# Patient Record
Sex: MALE | URBAN - METROPOLITAN AREA
[De-identification: names, ages, dates, MRNs, and addresses within clinical notes are randomized per-mention and may not be internally consistent; named-entity substitution may affect disease eponyms.]

---

## 2021-01-28 ENCOUNTER — APPOINTMENT (OUTPATIENT)
Dept: URBAN - METROPOLITAN AREA CLINIC 186 | Age: 31
Setting detail: DERMATOLOGY
End: 2021-01-28

## 2021-01-28 DIAGNOSIS — L66.2 FOLLICULITIS DECALVANS: ICD-10-CM

## 2021-01-28 PROCEDURE — OTHER PRESCRIPTION: OTHER

## 2021-01-28 PROCEDURE — OTHER DIAGNOSIS COMMENT: OTHER

## 2021-01-28 PROCEDURE — OTHER TREATMENT REGIMEN: OTHER

## 2021-01-28 PROCEDURE — 99203 OFFICE O/P NEW LOW 30 MIN: CPT

## 2021-01-28 PROCEDURE — OTHER MIPS QUALITY: OTHER

## 2021-01-28 PROCEDURE — OTHER COUNSELING: OTHER

## 2021-01-28 PROCEDURE — OTHER ADDITIONAL NOTES: OTHER

## 2021-01-28 RX ORDER — HALOBETASOL PROPIONATE 0.5 MG/G
AEROSOL, FOAM TOPICAL
Qty: 1 | Refills: 3 | Status: ERX | COMMUNITY
Start: 2021-01-28

## 2021-01-28 RX ORDER — DOXYCYCLINE HYCLATE 200 MG/1
TABLET, DELAYED RELEASE ORAL
Qty: 30 | Refills: 3 | Status: ERX | COMMUNITY
Start: 2021-01-28

## 2021-01-28 ASSESSMENT — LOCATION SIMPLE DESCRIPTION DERM: LOCATION SIMPLE: SCALP

## 2021-01-28 ASSESSMENT — LOCATION ZONE DERM: LOCATION ZONE: SCALP

## 2021-01-28 ASSESSMENT — LOCATION DETAILED DESCRIPTION DERM: LOCATION DETAILED: LEFT SUPERIOR PARIETAL SCALP

## 2021-01-28 NOTE — PROCEDURE: DIAGNOSIS COMMENT
Render Risk Assessment In Note?: no
Comment: Discussed clinically favor folliculitis raul. Discussed condition at length including long term expectations and treatment options. Discussed potential risks and side effects.  Will start doryx 200 and lexette.
Detail Level: Zone

## 2021-01-28 NOTE — PROCEDURE: TREATMENT REGIMEN
Samples Given: Lexette
Detail Level: Zone
Initiate Treatment: Doryx 200 mg daily\\nLexette foam twice daily M-F taking weekends off

## 2021-01-28 NOTE — PROCEDURE: ADDITIONAL NOTES
Additional Notes: Physician correspondence enclosed to Dr. West
Render Risk Assessment In Note?: no
Detail Level: Simple

## 2021-04-28 ENCOUNTER — APPOINTMENT (OUTPATIENT)
Dept: URBAN - METROPOLITAN AREA CLINIC 186 | Age: 31
Setting detail: DERMATOLOGY
End: 2021-04-28

## 2021-04-28 VITALS — TEMPERATURE: 97.7 F

## 2021-04-28 VITALS — HEIGHT: 60 IN

## 2021-04-28 DIAGNOSIS — L66.2 FOLLICULITIS DECALVANS: ICD-10-CM

## 2021-04-28 PROCEDURE — OTHER ADDITIONAL NOTES: OTHER

## 2021-04-28 PROCEDURE — OTHER COUNSELING: OTHER

## 2021-04-28 PROCEDURE — 99213 OFFICE O/P EST LOW 20 MIN: CPT

## 2021-04-28 PROCEDURE — OTHER PRESCRIPTION: OTHER

## 2021-04-28 PROCEDURE — OTHER MIPS QUALITY: OTHER

## 2021-04-28 PROCEDURE — OTHER DIAGNOSIS COMMENT: OTHER

## 2021-04-28 PROCEDURE — OTHER TREATMENT REGIMEN: OTHER

## 2021-04-28 PROCEDURE — OTHER PRESCRIPTION MEDICATION MANAGEMENT: OTHER

## 2021-04-28 RX ORDER — DOXYCYCLINE 40 MG/1
CAPSULE ORAL
Qty: 30 | Refills: 6 | Status: ERX | COMMUNITY
Start: 2021-04-28

## 2021-04-28 ASSESSMENT — LOCATION SIMPLE DESCRIPTION DERM: LOCATION SIMPLE: SCALP

## 2021-04-28 ASSESSMENT — LOCATION DETAILED DESCRIPTION DERM: LOCATION DETAILED: LEFT SUPERIOR PARIETAL SCALP

## 2021-04-28 ASSESSMENT — LOCATION ZONE DERM: LOCATION ZONE: SCALP

## 2021-04-28 NOTE — PROCEDURE: TREATMENT REGIMEN
Detail Level: Zone
Continue Regimen: Lexette foam twice daily M-F taking weekends off as needed for flares
Discontinue Regimen: Doryx
Initiate Treatment: Oracea 40mg by mouth when Doryx is completed

## 2021-04-28 NOTE — PROCEDURE: ADDITIONAL NOTES
Render Risk Assessment In Note?: no
Detail Level: Simple
Additional Notes: Discussed Doryx and lexette should not be used long-term and are more of a rescue regimen for flares.  Recommended tapering Doryx to every other day and Oracea 40 mg by mouth daily for future treatment

## 2021-04-28 NOTE — PROCEDURE: PRESCRIPTION MEDICATION MANAGEMENT
Render In Strict Bullet Format?: No
Continue Regimen: Lexette foam Apply to affected areas twice daily Monday-Friday, taking weekends off
Detail Level: Zone

## 2021-08-12 ENCOUNTER — APPOINTMENT (OUTPATIENT)
Dept: URBAN - METROPOLITAN AREA CLINIC 186 | Age: 31
Setting detail: DERMATOLOGY
End: 2021-08-12

## 2021-08-12 ENCOUNTER — RX ONLY (RX ONLY)
Age: 31
End: 2021-08-12

## 2021-08-12 DIAGNOSIS — L66.2 FOLLICULITIS DECALVANS: ICD-10-CM

## 2021-08-12 PROCEDURE — OTHER MIPS QUALITY: OTHER

## 2021-08-12 PROCEDURE — 99213 OFFICE O/P EST LOW 20 MIN: CPT

## 2021-08-12 PROCEDURE — OTHER COUNSELING: OTHER

## 2021-08-12 PROCEDURE — OTHER TREATMENT REGIMEN: OTHER

## 2021-08-12 PROCEDURE — OTHER ADDITIONAL NOTES: OTHER

## 2021-08-12 PROCEDURE — OTHER PRESCRIPTION MEDICATION MANAGEMENT: OTHER

## 2021-08-12 PROCEDURE — OTHER DIAGNOSIS COMMENT: OTHER

## 2021-08-12 RX ORDER — DOXYCYCLINE 40 MG/1
CAPSULE ORAL
Qty: 30 | Refills: 6 | Status: ERX | COMMUNITY
Start: 2021-08-12

## 2021-08-12 ASSESSMENT — LOCATION SIMPLE DESCRIPTION DERM: LOCATION SIMPLE: SCALP

## 2021-08-12 ASSESSMENT — LOCATION ZONE DERM: LOCATION ZONE: SCALP

## 2021-08-12 ASSESSMENT — LOCATION DETAILED DESCRIPTION DERM: LOCATION DETAILED: LEFT SUPERIOR PARIETAL SCALP

## 2021-08-12 NOTE — PROCEDURE: DIAGNOSIS COMMENT
Comment: Patient continues to do well. Has been out of meds for the last two weeks. Denies flare.  Discussed continuing current regimen versus stopping meds.  Discussed potentials risks including flare.  After discussion the patient prefers to continue oracea.
Render Risk Assessment In Note?: no
Detail Level: Zone

## 2021-08-12 NOTE — PROCEDURE: TREATMENT REGIMEN
Detail Level: Zone
Continue Regimen: Lexette foam twice daily M-F taking weekends off as needed for flares\\nRestart Oracea 40mg once daily

## 2021-08-12 NOTE — PROCEDURE: ADDITIONAL NOTES
Additional Notes: Recommended to restart oracea 40mg once daily in addition to lexette as needed for flares.  Discussed patient’s regimen is safe to use for long periods of time.  Recommended to follow up in 6 months
Detail Level: Simple
Render Risk Assessment In Note?: no

## 2021-08-12 NOTE — PROCEDURE: PRESCRIPTION MEDICATION MANAGEMENT
Continue Regimen: Lexette foam Apply to affected areas twice daily Monday-Friday, taking weekends off\\nRestart Oracea 40 mg by mouth daily
Detail Level: Zone
Render In Strict Bullet Format?: No

## 2022-04-08 ENCOUNTER — RX ONLY (RX ONLY)
Age: 32
End: 2022-04-08

## 2022-04-08 ENCOUNTER — APPOINTMENT (OUTPATIENT)
Dept: URBAN - METROPOLITAN AREA CLINIC 186 | Age: 32
Setting detail: DERMATOLOGY
End: 2022-04-08

## 2022-04-08 DIAGNOSIS — Z41.9 ENCOUNTER FOR PROCEDURE FOR PURPOSES OTHER THAN REMEDYING HEALTH STATE, UNSPECIFIED: ICD-10-CM

## 2022-04-08 DIAGNOSIS — L66.2 FOLLICULITIS DECALVANS: ICD-10-CM

## 2022-04-08 PROCEDURE — OTHER ADDITIONAL NOTES: OTHER

## 2022-04-08 PROCEDURE — OTHER DIAGNOSIS COMMENT: OTHER

## 2022-04-08 PROCEDURE — 99213 OFFICE O/P EST LOW 20 MIN: CPT

## 2022-04-08 PROCEDURE — OTHER COSMETIC CONSULTATION - RED SPOTS: OTHER

## 2022-04-08 PROCEDURE — OTHER COSMETIC CONSULTATION: RED SPOTS: OTHER

## 2022-04-08 PROCEDURE — OTHER COUNSELING: OTHER

## 2022-04-08 PROCEDURE — OTHER PRESCRIPTION MEDICATION MANAGEMENT: OTHER

## 2022-04-08 PROCEDURE — OTHER COSMETIC CONSULTATION: MICRONEEDLING: OTHER

## 2022-04-08 RX ORDER — DOXYCYCLINE 40 MG/1
CAPSULE ORAL
Qty: 30 | Refills: 6 | Status: ERX

## 2022-04-08 ASSESSMENT — LOCATION ZONE DERM: LOCATION ZONE: SCALP

## 2022-04-08 ASSESSMENT — LOCATION SIMPLE DESCRIPTION DERM
LOCATION SIMPLE: SCALP
LOCATION SIMPLE: POSTERIOR SCALP

## 2022-04-08 ASSESSMENT — LOCATION DETAILED DESCRIPTION DERM
LOCATION DETAILED: POSTERIOR MID-PARIETAL SCALP
LOCATION DETAILED: LEFT SUPERIOR PARIETAL SCALP

## 2022-04-08 NOTE — PROCEDURE: DIAGNOSIS COMMENT
Comment: Patient doing well. Discussed active disease minimal.  Minimal flares.  Denies gi distress with oracea.  Will continue.  Discussed hair care and shaving.  Discussed scarring at length and discussed cynergy, micro needling and infini.  Discussed all off label in this area and discussed potential risks and side effects as well as results.  Patient to think about options
Render Risk Assessment In Note?: no
Detail Level: Simple

## 2022-04-08 NOTE — PROCEDURE: ADDITIONAL NOTES
Render Risk Assessment In Note?: no
Detail Level: Simple
Additional Notes: Discussed the differences between folliculitis vs ingrown hairs.
Additional Notes: Infini:  Microneedling with fractionated radiofrequency  helps improve color, clarity, texture, and collagen stimulation.  This is typically  a series of treatments\\nThese are performed at monthly intervals for a set of three.  Pretreating the skin with a prescription Vitamin A ( tretinoin) will be recommended 2-4 weeks before the procedure. The patient is aware that practicing sun avoidance and daily application of sunscreens and sun block will improve results.  The patient was counseled that any development of cold sores could be detrimental after a resurfacing procedure  and agrees to call the office for immediate attention.

## 2022-04-08 NOTE — PROCEDURE: PRESCRIPTION MEDICATION MANAGEMENT
Render In Strict Bullet Format?: No
Detail Level: Zone
Plan: Patient states he hasn’t noticed any breakouts with current regimen. Patient has not been using the Lexette foam, stating he is stable with just the oracea 40mg.
Discontinue Regimen: Lexette foam
Continue Regimen: Oracea 40 mg by mouth daily.

## 2023-02-08 ENCOUNTER — RX ONLY (RX ONLY)
Age: 33
End: 2023-02-08

## 2023-02-08 RX ORDER — DOXYCYCLINE HYCLATE 50 MG/1
TABLET, FILM COATED ORAL
Qty: 30 | Refills: 2 | Status: ERX | COMMUNITY
Start: 2023-02-08

## 2024-01-29 ENCOUNTER — RX ONLY (RX ONLY)
Age: 34
End: 2024-01-29

## 2024-01-29 ENCOUNTER — APPOINTMENT (OUTPATIENT)
Dept: URBAN - METROPOLITAN AREA CLINIC 186 | Age: 34
Setting detail: DERMATOLOGY
End: 2024-01-29

## 2024-01-29 DIAGNOSIS — L66.2 FOLLICULITIS DECALVANS: ICD-10-CM

## 2024-01-29 PROCEDURE — 99213 OFFICE O/P EST LOW 20 MIN: CPT

## 2024-01-29 PROCEDURE — OTHER COUNSELING: OTHER

## 2024-01-29 PROCEDURE — OTHER PRESCRIPTION MEDICATION MANAGEMENT: OTHER

## 2024-01-29 PROCEDURE — OTHER DIAGNOSIS COMMENT: OTHER

## 2024-01-29 RX ORDER — DOXYCYCLINE 40 MG/1
CAPSULE ORAL
Qty: 30 | Refills: 11 | Status: ERX

## 2024-01-29 ASSESSMENT — LOCATION SIMPLE DESCRIPTION DERM: LOCATION SIMPLE: SCALP

## 2024-01-29 ASSESSMENT — LOCATION ZONE DERM: LOCATION ZONE: SCALP

## 2024-01-29 ASSESSMENT — LOCATION DETAILED DESCRIPTION DERM: LOCATION DETAILED: LEFT SUPERIOR PARIETAL SCALP

## 2024-01-29 NOTE — PROCEDURE: PRESCRIPTION MEDICATION MANAGEMENT
Render In Strict Bullet Format?: No
Detail Level: Zone
Initiate Treatment: Oracea 40 mg by mouth daily.

## 2024-01-29 NOTE — PROCEDURE: DIAGNOSIS COMMENT
Comment: Patient has been out of meds for a few months.  Has not had many flares.  Discussed predominately scars.  Discussed goals.  Patient denies gi distress. Patient prefers to restart oracea after discussing risks and side effects.
Render Risk Assessment In Note?: no
Detail Level: Simple

## 2024-04-10 ENCOUNTER — RX ONLY (RX ONLY)
Age: 34
End: 2024-04-10

## 2024-04-10 RX ORDER — DOXYCYCLINE HYCLATE 50 MG/1
TABLET, FILM COATED ORAL
Qty: 30 | Refills: 2 | Status: ERX | COMMUNITY
Start: 2024-04-10

## 2024-11-20 ENCOUNTER — RX ONLY (RX ONLY)
Age: 34
End: 2024-11-20

## 2024-11-20 RX ORDER — DOXYCYCLINE HYCLATE 50 MG/1
TABLET, FILM COATED ORAL
Qty: 30 | Refills: 1 | Status: ERX | COMMUNITY
Start: 2024-11-20

## 2024-11-26 ENCOUNTER — RX ONLY (RX ONLY)
Age: 34
End: 2024-11-26

## 2024-11-26 RX ORDER — DOXYCYCLINE HYCLATE 20 MG
TABLET ORAL
Qty: 60 | Refills: 2 | Status: ERX | COMMUNITY
Start: 2024-11-26

## 2025-01-27 ENCOUNTER — RX ONLY (RX ONLY)
Age: 35
End: 2025-01-27

## 2025-01-27 ENCOUNTER — APPOINTMENT (OUTPATIENT)
Dept: URBAN - METROPOLITAN AREA CLINIC 186 | Age: 35
Setting detail: DERMATOLOGY
End: 2025-01-27

## 2025-01-27 DIAGNOSIS — L81.4 OTHER MELANIN HYPERPIGMENTATION: ICD-10-CM

## 2025-01-27 DIAGNOSIS — L66.2 FOLLICULITIS DECALVANS: ICD-10-CM

## 2025-01-27 DIAGNOSIS — L82.1 OTHER SEBORRHEIC KERATOSIS: ICD-10-CM

## 2025-01-27 DIAGNOSIS — D22 MELANOCYTIC NEVI: ICD-10-CM

## 2025-01-27 DIAGNOSIS — D18.0 HEMANGIOMA: ICD-10-CM

## 2025-01-27 PROBLEM — D48.5 NEOPLASM OF UNCERTAIN BEHAVIOR OF SKIN: Status: ACTIVE | Noted: 2025-01-27

## 2025-01-27 PROBLEM — D22.5 MELANOCYTIC NEVI OF TRUNK: Status: ACTIVE | Noted: 2025-01-27

## 2025-01-27 PROBLEM — D18.01 HEMANGIOMA OF SKIN AND SUBCUTANEOUS TISSUE: Status: ACTIVE | Noted: 2025-01-27

## 2025-01-27 PROCEDURE — OTHER DEFER: OTHER

## 2025-01-27 PROCEDURE — OTHER COUNSELING: OTHER

## 2025-01-27 PROCEDURE — 99214 OFFICE O/P EST MOD 30 MIN: CPT

## 2025-01-27 PROCEDURE — OTHER REASSURANCE: OTHER

## 2025-01-27 PROCEDURE — OTHER PRESCRIPTION MEDICATION MANAGEMENT: OTHER

## 2025-01-27 PROCEDURE — OTHER SUNSCREEN TREATMENT REGIMEN: OTHER

## 2025-01-27 RX ORDER — DOXYCYCLINE HYCLATE 20 MG
TABLET ORAL
Qty: 60 | Refills: 11 | Status: ERX | COMMUNITY
Start: 2025-01-27

## 2025-01-27 ASSESSMENT — LOCATION SIMPLE DESCRIPTION DERM
LOCATION SIMPLE: SCALP
LOCATION SIMPLE: ABDOMEN
LOCATION SIMPLE: RIGHT CALF
LOCATION SIMPLE: RIGHT SHOULDER
LOCATION SIMPLE: CHEST
LOCATION SIMPLE: LEFT SHOULDER
LOCATION SIMPLE: RIGHT LOWER BACK
LOCATION SIMPLE: LEFT UPPER BACK

## 2025-01-27 ASSESSMENT — LOCATION DETAILED DESCRIPTION DERM
LOCATION DETAILED: MIDDLE STERNUM
LOCATION DETAILED: LEFT MID-UPPER BACK
LOCATION DETAILED: RIGHT RIB CAGE
LOCATION DETAILED: EPIGASTRIC SKIN
LOCATION DETAILED: RIGHT POSTERIOR SHOULDER
LOCATION DETAILED: RIGHT PROXIMAL CALF
LOCATION DETAILED: RIGHT SUPERIOR MEDIAL MIDBACK
LOCATION DETAILED: LEFT POSTERIOR SHOULDER
LOCATION DETAILED: LEFT SUPERIOR PARIETAL SCALP

## 2025-01-27 ASSESSMENT — LOCATION ZONE DERM
LOCATION ZONE: LEG
LOCATION ZONE: TRUNK
LOCATION ZONE: ARM
LOCATION ZONE: SCALP

## 2025-01-27 NOTE — PROCEDURE: PRESCRIPTION MEDICATION MANAGEMENT
Render In Strict Bullet Format?: No
Detail Level: Zone
Continue Regimen: Doxycycline 20mg, two tablets daily.

## 2025-02-18 ENCOUNTER — APPOINTMENT (OUTPATIENT)
Dept: URBAN - METROPOLITAN AREA CLINIC 186 | Age: 35
Setting detail: DERMATOLOGY
End: 2025-02-18

## 2025-02-18 DIAGNOSIS — D22 MELANOCYTIC NEVI: ICD-10-CM

## 2025-02-18 PROBLEM — D48.5 NEOPLASM OF UNCERTAIN BEHAVIOR OF SKIN: Status: ACTIVE | Noted: 2025-02-18

## 2025-02-18 PROCEDURE — OTHER COUNSELING: OTHER

## 2025-02-18 PROCEDURE — OTHER BIOPSY BY SHAVE METHOD: OTHER

## 2025-02-18 PROCEDURE — 11102 TANGNTL BX SKIN SINGLE LES: CPT

## 2025-02-18 PROCEDURE — 11103 TANGNTL BX SKIN EA SEP/ADDL: CPT

## 2025-02-18 ASSESSMENT — LOCATION SIMPLE DESCRIPTION DERM
LOCATION SIMPLE: RIGHT LOWER BACK
LOCATION SIMPLE: RIGHT CALF

## 2025-02-18 ASSESSMENT — LOCATION ZONE DERM
LOCATION ZONE: TRUNK
LOCATION ZONE: LEG

## 2025-02-18 ASSESSMENT — LOCATION DETAILED DESCRIPTION DERM
LOCATION DETAILED: RIGHT PROXIMAL CALF
LOCATION DETAILED: RIGHT SUPERIOR MEDIAL MIDBACK

## 2025-02-18 NOTE — PROCEDURE: BIOPSY BY SHAVE METHOD
Detail Level: Detailed
Depth Of Biopsy: dermis
Was A Bandage Applied: Yes
Size Of Lesion In Cm: 0
Biopsy Type: H and E
Biopsy Method: Dermablade
Anesthesia Type: 1% lidocaine with epinephrine
Anesthesia Volume In Cc: 0.5
Hemostasis: Drysol
Wound Care: Petrolatum
Dressing: bandage
Destruction After The Procedure: No
Type Of Destruction Used: Curettage
Curettage Text: The wound bed was treated with curettage after the biopsy was performed.
Cryotherapy Text: The wound bed was treated with cryotherapy after the biopsy was performed.
Electrodesiccation Text: The wound bed was treated with electrodesiccation after the biopsy was performed.
Electrodesiccation And Curettage Text: The wound bed was treated with electrodesiccation and curettage after the biopsy was performed.
Silver Nitrate Text: The wound bed was treated with silver nitrate after the biopsy was performed.
Lab: [id number]
Lab Facility: [id number]
Medical Necessity Information: It is in your best interest to select a reason for this procedure from the list below. All of these items fulfill various CMS LCD requirements except the new and changing color options.
Consent: Written consent was obtained and risks were reviewed including but not limited to scarring, infection, bleeding, scabbing, incomplete removal, nerve damage and allergy to anesthesia.
Post-Care Instructions: I reviewed with the patient in detail post-care instructions. Patient is to keep the biopsy site dry overnight, and then apply bacitracin twice daily until healed. Patient may apply hydrogen peroxide soaks to remove any crusting.
Notification Instructions: Patient will be notified of biopsy results. However, patient instructed to call the office if not contacted within 2 weeks.
Billing Type: Third-Party Bill
Information: Selecting Yes will display possible errors in your note based on the variables you have selected. This validation is only offered as a suggestion for you. PLEASE NOTE THAT THE VALIDATION TEXT WILL BE REMOVED WHEN YOU FINALIZE YOUR NOTE. IF YOU WANT TO FAX A PRELIMINARY NOTE YOU WILL NEED TO TOGGLE THIS TO 'NO' IF YOU DO NOT WANT IT IN YOUR FAXED NOTE.

## 2025-05-21 ENCOUNTER — APPOINTMENT (OUTPATIENT)
Dept: URBAN - METROPOLITAN AREA CLINIC 186 | Age: 35
Setting detail: DERMATOLOGY
End: 2025-05-21

## 2025-05-21 DIAGNOSIS — D485 NEOPLASM OF UNCERTAIN BEHAVIOR OF SKIN: ICD-10-CM

## 2025-05-21 PROBLEM — D48.5 NEOPLASM OF UNCERTAIN BEHAVIOR OF SKIN: Status: ACTIVE | Noted: 2025-05-21

## 2025-05-21 PROCEDURE — OTHER EXCISION: OTHER

## 2025-05-21 PROCEDURE — 11402 EXC TR-EXT B9+MARG 1.1-2 CM: CPT

## 2025-05-21 PROCEDURE — 12032 INTMD RPR S/A/T/EXT 2.6-7.5: CPT

## 2025-05-21 ASSESSMENT — LOCATION DETAILED DESCRIPTION DERM: LOCATION DETAILED: RIGHT SUPERIOR MEDIAL MIDBACK

## 2025-05-21 ASSESSMENT — LOCATION ZONE DERM: LOCATION ZONE: TRUNK

## 2025-05-21 ASSESSMENT — LOCATION SIMPLE DESCRIPTION DERM: LOCATION SIMPLE: RIGHT LOWER BACK

## 2025-06-02 ENCOUNTER — APPOINTMENT (OUTPATIENT)
Dept: URBAN - METROPOLITAN AREA CLINIC 186 | Age: 35
Setting detail: DERMATOLOGY
End: 2025-06-02

## 2025-06-02 DIAGNOSIS — Z48.02 ENCOUNTER FOR REMOVAL OF SUTURES: ICD-10-CM

## 2025-06-02 PROCEDURE — OTHER SUTURE REMOVAL (GLOBAL PERIOD): OTHER

## 2025-06-02 ASSESSMENT — LOCATION DETAILED DESCRIPTION DERM: LOCATION DETAILED: SUPERIOR LUMBAR SPINE

## 2025-06-02 ASSESSMENT — PAIN INTENSITY VAS: HOW INTENSE IS YOUR PAIN 0 BEING NO PAIN, 10 BEING THE MOST SEVERE PAIN POSSIBLE?: NO PAIN

## 2025-06-02 ASSESSMENT — LOCATION SIMPLE DESCRIPTION DERM: LOCATION SIMPLE: LOWER BACK

## 2025-06-02 ASSESSMENT — LOCATION ZONE DERM: LOCATION ZONE: TRUNK
